# Patient Record
Sex: MALE | Race: WHITE | NOT HISPANIC OR LATINO | URBAN - METROPOLITAN AREA
[De-identification: names, ages, dates, MRNs, and addresses within clinical notes are randomized per-mention and may not be internally consistent; named-entity substitution may affect disease eponyms.]

---

## 2018-01-03 ENCOUNTER — OFFICE (OUTPATIENT)
Dept: URBAN - METROPOLITAN AREA CLINIC 12 | Facility: CLINIC | Age: 43
End: 2018-01-03

## 2018-01-03 VITALS
WEIGHT: 150 LBS | HEIGHT: 66 IN | DIASTOLIC BLOOD PRESSURE: 77 MMHG | HEART RATE: 87 BPM | SYSTOLIC BLOOD PRESSURE: 141 MMHG

## 2018-01-03 DIAGNOSIS — R12 HEARTBURN: ICD-10-CM

## 2018-01-03 DIAGNOSIS — R10.9 UNSPECIFIED ABDOMINAL PAIN: ICD-10-CM

## 2018-01-03 DIAGNOSIS — F41.9 ANXIETY DISORDER, UNSPECIFIED: ICD-10-CM

## 2018-01-03 DIAGNOSIS — R07.89 OTHER CHEST PAIN: ICD-10-CM

## 2018-01-03 LAB
FE+TIBC+FER: FERRITIN, SERUM: 146 NG/ML (ref 30–400)
FE+TIBC+FER: IRON BIND.CAP.(TIBC): 266 UG/DL (ref 250–450)
FE+TIBC+FER: IRON SATURATION: 33 % (ref 15–55)
FE+TIBC+FER: IRON, SERUM: 89 UG/DL (ref 38–169)
FE+TIBC+FER: UIBC: 177 UG/DL (ref 111–343)
IMMUNOGLOBULIN A, QN, SERUM: 180 MG/DL (ref 90–386)
T-TRANSGLUTAMINASE (TTG) IGA: <2 U/ML
VITAMIN B12: 606 PG/ML (ref 232–1245)

## 2018-01-03 PROCEDURE — 99243 OFF/OP CNSLTJ NEW/EST LOW 30: CPT | Performed by: SPECIALIST

## 2018-01-03 NOTE — SERVICEHPINOTES
Several months of fairly persistent substernal chest pain.  May last hours.  Has "attacks" that are intense but has low-level discomfort almost constantly.  Often associated with palpitations and anxiety.  Character can be pressure, burning, or sharper.  No relation to eating.  Swallows fine.  Pantoprazole therapy has helped minimally.  NTG did not help at all.  Extensive cardiac w/u reportedly normal.  EGD and colonoscopy unremarkable.

## 2018-02-14 ENCOUNTER — OFFICE (OUTPATIENT)
Dept: URBAN - METROPOLITAN AREA CLINIC 12 | Facility: CLINIC | Age: 43
End: 2018-02-14

## 2018-02-14 VITALS
HEART RATE: 80 BPM | HEIGHT: 66 IN | WEIGHT: 144 LBS | SYSTOLIC BLOOD PRESSURE: 137 MMHG | DIASTOLIC BLOOD PRESSURE: 81 MMHG

## 2018-02-14 DIAGNOSIS — R07.89 OTHER CHEST PAIN: ICD-10-CM

## 2018-02-14 DIAGNOSIS — R12 HEARTBURN: ICD-10-CM

## 2018-02-14 DIAGNOSIS — F41.9 ANXIETY DISORDER, UNSPECIFIED: ICD-10-CM

## 2018-02-14 PROCEDURE — 99212 OFFICE O/P EST SF 10 MIN: CPT | Performed by: SPECIALIST
